# Patient Record
Sex: FEMALE | Race: OTHER | NOT HISPANIC OR LATINO
[De-identification: names, ages, dates, MRNs, and addresses within clinical notes are randomized per-mention and may not be internally consistent; named-entity substitution may affect disease eponyms.]

---

## 2020-06-03 ENCOUNTER — LABORATORY RESULT (OUTPATIENT)
Age: 55
End: 2020-06-03

## 2020-06-03 ENCOUNTER — OUTPATIENT (OUTPATIENT)
Dept: OUTPATIENT SERVICES | Facility: HOSPITAL | Age: 55
LOS: 1 days | End: 2020-06-03
Payer: COMMERCIAL

## 2020-06-03 ENCOUNTER — APPOINTMENT (OUTPATIENT)
Dept: RHEUMATOLOGY | Facility: CLINIC | Age: 55
End: 2020-06-03
Payer: COMMERCIAL

## 2020-06-03 ENCOUNTER — APPOINTMENT (OUTPATIENT)
Dept: RADIOLOGY | Facility: CLINIC | Age: 55
End: 2020-06-03
Payer: COMMERCIAL

## 2020-06-03 VITALS
WEIGHT: 158 LBS | OXYGEN SATURATION: 98 % | HEART RATE: 108 BPM | BODY MASS INDEX: 29.08 KG/M2 | DIASTOLIC BLOOD PRESSURE: 96 MMHG | SYSTOLIC BLOOD PRESSURE: 150 MMHG | HEIGHT: 62 IN | TEMPERATURE: 97.8 F

## 2020-06-03 DIAGNOSIS — M79.7 FIBROMYALGIA: ICD-10-CM

## 2020-06-03 DIAGNOSIS — Z72.89 OTHER PROBLEMS RELATED TO LIFESTYLE: ICD-10-CM

## 2020-06-03 DIAGNOSIS — Z78.9 OTHER SPECIFIED HEALTH STATUS: ICD-10-CM

## 2020-06-03 PROCEDURE — 73070 X-RAY EXAM OF ELBOW: CPT | Mod: 26,LT

## 2020-06-03 PROCEDURE — 99204 OFFICE O/P NEW MOD 45 MIN: CPT

## 2020-06-03 PROCEDURE — 72040 X-RAY EXAM NECK SPINE 2-3 VW: CPT | Mod: 26

## 2020-06-03 PROCEDURE — 73070 X-RAY EXAM OF ELBOW: CPT

## 2020-06-03 PROCEDURE — 72040 X-RAY EXAM NECK SPINE 2-3 VW: CPT

## 2020-06-03 RX ORDER — CETIRIZINE HCL 10 MG
TABLET ORAL
Refills: 0 | Status: ACTIVE | COMMUNITY

## 2020-06-03 RX ORDER — DICLOFENAC SODIUM 10 MG/G
1 GEL TOPICAL
Qty: 5 | Refills: 6 | Status: ACTIVE | COMMUNITY
Start: 2020-06-03 | End: 1900-01-01

## 2020-06-03 RX ORDER — ALPRAZOLAM 0.25 MG/1
0.25 TABLET ORAL
Refills: 0 | Status: ACTIVE | COMMUNITY

## 2020-06-03 NOTE — HISTORY OF PRESENT ILLNESS
[FreeTextEntry1] : 54 year old  female with a history of fibromyalgia and chronic fatigue syndrome presents for further evaluation of her fibromyalgia. She recalls seeing rheumatology many years ago. 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 Because of this her routine has been messed up. She is feeling more achy and fatigued than usual. She states the fatigue as severe. She rates her aches and pains as moderate. She was supposed to get sleep studies but it got canceled in early March.\par \par The main issue is the fact that she cannot sleep. She uses amitriptyline to help with sleep and helps on occasion but not always.\par \par She admits to hair thinning, occasional rashes, Raynaud's phenomenon and joint pains. She denies daily NSAID use. She tries to use supplements. She denies arises or colitis. She has a history of asthma but tries to avoid oral steroid use.\par \par She has had one pregnancy denies miscarriages or thromboembolism. There is no family history of autoimmunity.\par \par She has a good appetite and denies weight loss. She denies fevers or chills or night sweats. She is otherwise up-to-date on her age-appropriate screenings.

## 2020-06-03 NOTE — REVIEW OF SYSTEMS
[Arthralgias] : arthralgias [Joint Pain] : joint pain [Feeling Tired] : feeling tired [Negative] : Heme/Lymph

## 2020-06-03 NOTE — PHYSICAL EXAM
[General Appearance - Well Nourished] : well nourished [General Appearance - Alert] : alert [General Appearance - Well Developed] : well developed [Sclera] : the sclera and conjunctiva were normal [Neck Appearance] : the appearance of the neck was normal [Oropharynx] : the oropharynx was normal [Respiration, Rhythm And Depth] : normal respiratory rhythm and effort [Auscultation Breath Sounds / Voice Sounds] : lungs were clear to auscultation bilaterally [Heart Sounds] : normal S1 and S2 [Full Pulse] : the pedal pulses are present [Edema] : there was no peripheral edema [Abdomen Tenderness] : non-tender [Cervical Lymph Nodes Enlarged Anterior Bilaterally] : anterior cervical [Supraclavicular Lymph Nodes Enlarged Bilaterally] : supraclavicular [No Spinal Tenderness] : no spinal tenderness [Abnormal Walk] : normal gait [Nail Clubbing] : no clubbing  or cyanosis of the fingernails [Motor Tone] : muscle strength and tone were normal [Musculoskeletal - Swelling] : no joint swelling seen [] : no rash [Deep Tendon Reflexes (DTR)] : deep tendon reflexes were 2+ and symmetric [Motor Exam] : the motor exam was normal [Affect] : the affect was normal [Impaired Insight] : insight and judgment were intact [Oriented To Time, Place, And Person] : oriented to person, place, and time [FreeTextEntry1] : FROM neck, shoulders, elbows, wrists, hands, hips, knees, ankles and feet, including the small joints of the hands and feet without any evidence of inflammatory arthritis no tender points noted restricted ROM left elbow

## 2020-06-03 NOTE — ASSESSMENT
[FreeTextEntry1] : 54-year-old  female with a history of fibromyalgia and chronic fatigue syndrome presents as a new patient today. She recalls seeing rheumatology many years ago.\par \par Her current review of systems is positive for fatigue, chronic pain, poor sleep, recent worsening of joint pain that has been migratory in nature.\par Today there are no labs available for review. On my examination she has full range of motion of her joints without any evidence of inflammatory arthritis. She does not have any tender points on exam. She does have restricted range of motion of her left elbow, she denies any trauma or injury. There is no warmth emanating from the elbow.\par \par At this time certainly fibromyalgia is the possible diagnosis. The other possibility includes sleep apnea given her poor sleep habits. I am concerned about the restricted range of motion of her left elbow and will evaluate her for inflammatory arthropathy as well.\par \par Plan-\par -She is to continue amitriptyline at bedtime\par -Recommend sleep studies as she was scheduled for them, she may have sleep apnea.\par -Recommend routine with sleep habits and avoiding daytime naps, to exercise as tolerated.\par -She is to have baseline labs to rule out possible inflammatory arthropathy.\par -She is to have baseline x-ray of her neck and elbow to evaluate for cervical radiculopathy as well as inflammatory arthropathy.\par -I will be calling her with the results\par -Trial of voltaren gel\par -Followup in the office in 4 weeks\par -She is aware to call if her symptoms worsen

## 2020-06-03 NOTE — CONSULT LETTER
[Dear  ___] : Dear  [unfilled], [Consult Letter:] : I had the pleasure of evaluating your patient, [unfilled]. [Please see my note below.] : Please see my note below. [Consult Closing:] : Thank you very much for allowing me to participate in the care of this patient.  If you have any questions, please do not hesitate to contact me. [Sincerely,] : Sincerely, [FreeTextEntry3] : Joel Blevins D.O\par

## 2020-06-04 LAB
25(OH)D3 SERPL-MCNC: 24.9 NG/ML
ALBUMIN MFR SERPL ELPH: 54.3 %
ALBUMIN SERPL ELPH-MCNC: 4.5 G/DL
ALBUMIN SERPL-MCNC: 4.1 G/DL
ALBUMIN/GLOB SERPL: 1.2 RATIO
ALP BLD-CCNC: 69 U/L
ALPHA1 GLOB MFR SERPL ELPH: 4.1 %
ALPHA1 GLOB SERPL ELPH-MCNC: 0.3 G/DL
ALPHA2 GLOB MFR SERPL ELPH: 12.1 %
ALPHA2 GLOB SERPL ELPH-MCNC: 0.9 G/DL
ALT SERPL-CCNC: 14 U/L
ANION GAP SERPL CALC-SCNC: 14 MMOL/L
AST SERPL-CCNC: 19 U/L
B-GLOBULIN MFR SERPL ELPH: 14.4 %
B-GLOBULIN SERPL ELPH-MCNC: 1.1 G/DL
BASOPHILS # BLD AUTO: 0.05 K/UL
BASOPHILS NFR BLD AUTO: 0.8 %
BILIRUB SERPL-MCNC: 0.3 MG/DL
BUN SERPL-MCNC: 15 MG/DL
CALCIUM SERPL-MCNC: 9.9 MG/DL
CENTROMERE IGG SER-ACNC: <0.2 CD:130001892
CHLORIDE SERPL-SCNC: 103 MMOL/L
CK SERPL-CCNC: 73 U/L
CO2 SERPL-SCNC: 24 MMOL/L
CREAT SERPL-MCNC: 0.89 MG/DL
CREAT SPEC-SCNC: 225 MG/DL
CREAT/PROT UR: 0.1 RATIO
CRP SERPL-MCNC: 0.68 MG/DL
DSDNA AB SER-ACNC: <12 IU/ML
ENA RNP AB SER IA-ACNC: 1.6 AL
ENA SM AB SER IA-ACNC: <0.2 AL
ENA SS-A AB SER IA-ACNC: <0.2 AL
ENA SS-B AB SER IA-ACNC: <0.2 AL
EOSINOPHIL # BLD AUTO: 0.6 K/UL
EOSINOPHIL NFR BLD AUTO: 9.4 %
ERYTHROCYTE [SEDIMENTATION RATE] IN BLOOD BY WESTERGREN METHOD: 59 MM/HR
GAMMA GLOB FLD ELPH-MCNC: 1.1 G/DL
GAMMA GLOB MFR SERPL ELPH: 15.1 %
GLUCOSE SERPL-MCNC: 69 MG/DL
HAV IGM SER QL: NONREACTIVE
HBV CORE IGM SER QL: NONREACTIVE
HBV SURFACE AG SER QL: NONREACTIVE
HCT VFR BLD CALC: 42.9 %
HCV AB SER QL: NONREACTIVE
HCV S/CO RATIO: 0.51 S/CO
HGB BLD-MCNC: 13.6 G/DL
IMM GRANULOCYTES NFR BLD AUTO: 0.3 %
INTERPRETATION SERPL IEP-IMP: NORMAL
LUPUS ANTICOAGULANT CASCADE REFLEX: NORMAL
LYMPHOCYTES # BLD AUTO: 1.86 K/UL
LYMPHOCYTES NFR BLD AUTO: 29.1 %
MAN DIFF?: NORMAL
MCHC RBC-ENTMCNC: 29.1 PG
MCHC RBC-ENTMCNC: 31.7 GM/DL
MCV RBC AUTO: 91.7 FL
MONOCYTES # BLD AUTO: 0.37 K/UL
MONOCYTES NFR BLD AUTO: 5.8 %
MPO AB + PR3 PNL SER: NORMAL
NEUTROPHILS # BLD AUTO: 3.5 K/UL
NEUTROPHILS NFR BLD AUTO: 54.6 %
PLATELET # BLD AUTO: 291 K/UL
POTASSIUM SERPL-SCNC: 4.2 MMOL/L
PROT SERPL-MCNC: 7.5 G/DL
PROT UR-MCNC: 14 MG/DL
RBC # BLD: 4.68 M/UL
RBC # FLD: 13.7 %
RHEUMATOID FACT SER QL: <10 IU/ML
SODIUM SERPL-SCNC: 141 MMOL/L
THYROGLOB AB SERPL-ACNC: <20 IU/ML
THYROPEROXIDASE AB SERPL IA-ACNC: 53.5 IU/ML
WBC # FLD AUTO: 6.4 K/UL

## 2020-06-05 LAB
CARDIOLIPIN IGM SER-MCNC: 8.4 MPL
CARDIOLIPIN IGM SER-MCNC: <5 GPL
CCP AB SER IA-ACNC: <8 UNITS
HLA-B27 RELATED AG QL: NORMAL
RF+CCP IGG SER-IMP: NEGATIVE

## 2020-06-08 LAB
ANA SER IF-ACNC: NEGATIVE
G6PD SER-CCNC: 12.1 U/G HGB

## 2020-06-23 ENCOUNTER — APPOINTMENT (OUTPATIENT)
Dept: RHEUMATOLOGY | Facility: CLINIC | Age: 55
End: 2020-06-23
Payer: COMMERCIAL

## 2020-06-23 VITALS
HEART RATE: 101 BPM | OXYGEN SATURATION: 98 % | BODY MASS INDEX: 28.9 KG/M2 | SYSTOLIC BLOOD PRESSURE: 160 MMHG | WEIGHT: 158 LBS | DIASTOLIC BLOOD PRESSURE: 100 MMHG | TEMPERATURE: 97.9 F

## 2020-06-23 PROCEDURE — 99214 OFFICE O/P EST MOD 30 MIN: CPT

## 2020-06-23 NOTE — ASSESSMENT
[FreeTextEntry1] : 54-year-old  female with a history of fibromyalgia and chronic fatigue syndrome recalls seeing rheumatology many years ago.\par \par Her current review of systems is positive for fatigue, chronic pain, poor sleep, recent worsening of joint pain that has been migratory in nature.\par On my examination she has full range of motion of her joints without any evidence of inflammatory arthritis. She does not have any tender points on exam. She does have restricted range of motion of her left elbow, she denies any trauma or injury. There is no warmth emanating from the elbow.\par \par At this time certainly fibromyalgia is the possible diagnosis. 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 or b.i.d. depending on her pain level\par -I will reassess her symptoms with the higher dose of amitriptyline\par -If she continues to be symptomatic will consider using Plaquenil\par -She will also need a baseline echocardiogram and a chest x-ray\par -Left message to speak with her primary care physician regarding this\par -her sedimentation rate is in the 60s but workup for monoclonal paraproteinemia is negative\par -Followup 6 weeks\par

## 2020-06-23 NOTE — PHYSICAL EXAM
[General Appearance - Alert] : alert [General Appearance - Well Developed] : well developed [General Appearance - Well Nourished] : well nourished [Sclera] : the sclera and conjunctiva were normal [Oropharynx] : the oropharynx was normal [Neck Appearance] : the appearance of the neck was normal [Respiration, Rhythm And Depth] : normal respiratory rhythm and effort [Auscultation Breath Sounds / Voice Sounds] : lungs were clear to auscultation bilaterally [Heart Sounds] : normal S1 and S2 [Full Pulse] : the pedal pulses are present [Edema] : there was no peripheral edema [Abdomen Tenderness] : non-tender [Supraclavicular Lymph Nodes Enlarged Bilaterally] : supraclavicular [Cervical Lymph Nodes Enlarged Anterior Bilaterally] : anterior cervical [No Spinal Tenderness] : no spinal tenderness [Abnormal Walk] : normal gait [Musculoskeletal - Swelling] : no joint swelling seen [Nail Clubbing] : no clubbing  or cyanosis of the fingernails [Motor Tone] : muscle strength and tone were normal [FreeTextEntry1] : FROM neck, shoulders, elbows, wrists, hands, hips, knees, ankles and feet, including the small joints of the hands and feet without any evidence of inflammatory arthritis no tender points noted restricted ROM left elbow [] : no rash [Oriented To Time, Place, And Person] : oriented to person, place, and time [Motor Exam] : the motor exam was normal [Deep Tendon Reflexes (DTR)] : deep tendon reflexes were 2+ and symmetric [Impaired Insight] : insight and judgment were intact [Affect] : the affect was normal

## 2020-06-23 NOTE — HISTORY OF PRESENT ILLNESS
[FreeTextEntry1] : 54 year old  female with a history of fibromyalgia and chronic fatigue syndrome presents for further evaluation of her fibromyalgia. She recalls seeing rheumatology many years ago. 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 Because of this her routine has been messed up. She is feeling more achy and fatigued than usual. She states the fatigue as severe. She rates her aches and pains as moderate. She was supposed to get sleep studies but it got canceled in early March.\par \par The main issue is the fact that she cannot sleep. She uses amitriptyline to help with sleep and helps on occasion but not always.\par \par She admits to hair thinning, occasional rashes, Raynaud's phenomenon and joint pains. She denies daily NSAID use. She tries to use supplements. She denies arises or colitis. She has a history of asthma but tries to avoid oral steroid use.\par \par She has had one pregnancy denies miscarriages or thromboembolism. There is no family history of autoimmunity.\par Her labs with me reveal a sed rate in the 60s with a RNP an and thyroid ab but her SHAWN is negative \par She has a good appetite and denies weight loss. She denies fevers or chills or night sweats. She is otherwise up-to-date on her age-appropriate screenings.

## 2020-07-09 ENCOUNTER — APPOINTMENT (OUTPATIENT)
Dept: BARIATRICS/WEIGHT MGMT | Facility: CLINIC | Age: 55
End: 2020-07-09
Payer: COMMERCIAL

## 2020-07-09 DIAGNOSIS — Z72.820 SLEEP DEPRIVATION: ICD-10-CM

## 2020-07-09 PROCEDURE — 99205 OFFICE O/P NEW HI 60 MIN: CPT | Mod: 95

## 2020-07-14 ENCOUNTER — APPOINTMENT (OUTPATIENT)
Dept: BARIATRICS/WEIGHT MGMT | Facility: CLINIC | Age: 55
End: 2020-07-14
Payer: COMMERCIAL

## 2020-07-14 VITALS
SYSTOLIC BLOOD PRESSURE: 132 MMHG | HEART RATE: 86 BPM | WEIGHT: 163.25 LBS | HEIGHT: 62 IN | BODY MASS INDEX: 30.04 KG/M2 | DIASTOLIC BLOOD PRESSURE: 80 MMHG

## 2020-07-14 DIAGNOSIS — M25.50 PAIN IN UNSPECIFIED JOINT: ICD-10-CM

## 2020-07-14 PROCEDURE — 99213 OFFICE O/P EST LOW 20 MIN: CPT

## 2020-07-14 NOTE — HISTORY OF PRESENT ILLNESS
[FreeTextEntry1] : This is a 54 year old female  present in office today for followup visit. \par \par Patient reports of joint pain, specifically in her knees the last couple of days. \par Her rheumatologist wants to start her on hydroxychloroquine for possible connective tissue disorder, and her PCP wants to start her on synthroid. \par Recent blood work done with rheum\par She is having a sleep study at the end of the month

## 2020-07-14 NOTE — PHYSICAL EXAM
[Obese, well nourished, in no acute distress] : obese, well nourished, in no acute distress [Normal] : affect appropriate [de-identified] : mallampati 3 [de-identified] : softly distended

## 2020-07-14 NOTE — ASSESSMENT
[FreeTextEntry1] : Physical assessment completed \par Blood work done in office \par Sleep study scheduled for next month \par \par f/u with Dr Joyce

## 2020-07-16 LAB
CHOLEST SERPL-MCNC: 185 MG/DL
CHOLEST/HDLC SERPL: 2.4 RATIO
ESTIMATED AVERAGE GLUCOSE: 108 MG/DL
HBA1C MFR BLD HPLC: 5.4 %
HDLC SERPL-MCNC: 76 MG/DL
INSULIN P FAST SERPL-ACNC: 3.4 UU/ML
LDLC SERPL CALC-MCNC: 100 MG/DL
TRIGL SERPL-MCNC: 47 MG/DL

## 2020-07-21 ENCOUNTER — APPOINTMENT (OUTPATIENT)
Dept: BARIATRICS/WEIGHT MGMT | Facility: CLINIC | Age: 55
End: 2020-07-21
Payer: COMMERCIAL

## 2020-07-21 DIAGNOSIS — M79.7 FIBROMYALGIA: ICD-10-CM

## 2020-07-21 DIAGNOSIS — E66.3 OVERWEIGHT: ICD-10-CM

## 2020-07-21 PROCEDURE — 99214 OFFICE O/P EST MOD 30 MIN: CPT | Mod: 95

## 2020-08-07 ENCOUNTER — APPOINTMENT (OUTPATIENT)
Dept: RHEUMATOLOGY | Facility: CLINIC | Age: 55
End: 2020-08-07

## 2020-08-12 PROBLEM — M79.7 FIBROMYALGIA: Status: ACTIVE | Noted: 2020-06-03

## 2020-08-12 PROBLEM — Z72.820 POOR SLEEP: Status: ACTIVE | Noted: 2020-06-03

## 2020-08-12 PROBLEM — E66.3 OVERWEIGHT (BMI 25.0-29.9): Status: ACTIVE | Noted: 2020-07-14

## 2020-08-12 NOTE — HISTORY OF PRESENT ILLNESS
[Home] : at home, [unfilled] , at the time of the visit. [Medical Office: ([facility])___] : at the medical office located in  [Verbal consent obtained from patient] : the patient, [unfilled] [FreeTextEntry1] : 55 yo woman with obesity, htn, anemia,  hashimoto's thyroiditis, fibromylagia and MCTD presents for initial eval and mgt of obesity and its sequelae.  She reports that weight and fatigue have been an issue for 29 years.  Was on bed rest and issues after pregnancy.  She has not had substantial weight loss as an adult (10lbs from weight watchers once).  She is currently at her max of 165 (BMI 30) with a steady 60+ lb weight gain since high school.  \par She has been seeing rheumatology for her fatigue and generalized pain.  Was originally diagnosed with fibromnyaglia and ultimately mixed connective tissues disease.  Also was told she had autoimmune thyroiditis and was recently prescribed thyroid replacement therapy but has not started yet.  Recent TSH was 5.6 with low normal free T3/freeT4.  \par She has issues with sleep quality and wakes up fatigued.  She does not engage in regular exercise. \par She typically skips breakfast and has lunch and dinner.  Occasionally has pizza or fried food but otherwise reports a lower total calorie, balanced diet with minimal meat.  She does consume some etoh. \par \par She would like to lose weight, improve energy and pain and help her  who is also enrolled in the program.

## 2020-08-12 NOTE — ASSESSMENT
[FreeTextEntry1] : BARIATRIC SURGERY HISTORY: none\par \par OBESITY COMORBIDITIES: HTN\par \par ANTI-OBESITY MEDICATIONS: none\par \par OBESITY MEDICATION SIDE EFFECTS: n/a\par \par 55 yo woman with obesity, htn, anemia,  hashimoto's thyroiditis, fibromylagia and MCTD\par \par will start 0.5 grains of armour thyroid\par start iron supplementation\par start b12\par recommended nguyễn/turneric/matcha tea\par goal is ultimately to increase physical activity but should do so slowly\par WFPB (or close to it) diet\par \par rtc in 3 weeks.\par close monitoring\par \par rtc in 1-2 weeks\par

## 2020-08-12 NOTE — ASSESSMENT
[FreeTextEntry1] : BARIATRIC SURGERY HISTORY: none\par \par OBESITY COMORBIDITIES: HTN\par \par ANTI-OBESITY MEDICATIONS: none\par \par OBESITY MEDICATION SIDE EFFECTS: n/a\par \par 55 yo woman with obesity, htn, anemia,  hashimoto's thyroiditis, fibromylagia and MCTD\par \par will come for in-person physical exam\par recheck some metabolic labs\par agree with starting thyroid meds, patient would prefer to be on armour thyroid than synthetic agent\par likely to recommend WFPB eating \par adequate hydration\par sleep hygeine\par close monitoring\par \par rtc in 1-2 weeks\par

## 2020-08-12 NOTE — HISTORY OF PRESENT ILLNESS
[Home] : at home, [unfilled] , at the time of the visit. [Medical Office: ([facility])___] : at the medical office located in  [Verbal consent obtained from patient] : the patient, [unfilled] [FreeTextEntry1] : returns for f/u\par physical exam complete and generally unremarkable\par new labs do not reveal metabolic dysfunction\par she reports feeling generally the same and wants to discuss meds

## 2020-08-12 NOTE — REASON FOR VISIT
[Follow-Up Visit] : a follow-up visit for [Obesity] : obesity [FreeTextEntry2] : polyarthralgia, fatigue, hypothyroidism

## 2020-08-21 ENCOUNTER — APPOINTMENT (OUTPATIENT)
Dept: RHEUMATOLOGY | Facility: CLINIC | Age: 55
End: 2020-08-21
Payer: COMMERCIAL

## 2020-08-21 VITALS
WEIGHT: 164 LBS | DIASTOLIC BLOOD PRESSURE: 80 MMHG | HEART RATE: 84 BPM | BODY MASS INDEX: 30.18 KG/M2 | SYSTOLIC BLOOD PRESSURE: 110 MMHG | TEMPERATURE: 96.8 F | HEIGHT: 62 IN | OXYGEN SATURATION: 96 %

## 2020-08-21 PROCEDURE — 99214 OFFICE O/P EST MOD 30 MIN: CPT

## 2020-08-21 RX ORDER — AMITRIPTYLINE HYDROCHLORIDE 50 MG/1
50 TABLET, FILM COATED ORAL
Qty: 90 | Refills: 1 | Status: ACTIVE | COMMUNITY
Start: 1900-01-01 | End: 1900-01-01

## 2020-08-21 RX ORDER — MELOXICAM 7.5 MG/1
7.5 TABLET ORAL TWICE DAILY
Qty: 60 | Refills: 2 | Status: DISCONTINUED | COMMUNITY
Start: 2020-06-23 | End: 2020-08-21

## 2020-08-21 NOTE — ASSESSMENT
[FreeTextEntry1] : 55-year-old  female with a history of fibromyalgia and chronic fatigue syndrome recalls seeing rheumatology many years ago.\par \par Her current review of systems is positive for fatigue, chronic pain, poor sleep, recent worsening of joint pain that has been migratory in nature.\par On my examination she has full range of motion of her joints without any evidence of inflammatory arthritis. She does not have any tender points on exam. She does have restricted range of motion of her left elbow, she denies any trauma or injury. There is no warmth emanating from the elbow.\par \par At this time certainly fibromyalgia is the possible diagnosis. The other possibility includes sleep apnea given her poor sleep habits.\par \par Fatigue-\par -Her main complaint is fatigue\par -She was supposed to get ruled out for sleep apnea and should continue to do this\par -maintain elavil at 50 mg qd \par - she is now on armor thyroid. \par - will obtain copy of sleep studies \par \par Abnormal labs-\par -Her labs revealed positive RNP with thyroid antibodies and a sedimentation rate in the 60s\par -She states that years ago she was told she may have lupus\par -Her SHAWN is negative\par -Certainly mixed connective tissue disease is in the differential diagnosis although I am not certain yet\par -Discussed with her that we could potentially try Plaquenil\par -If she continues to be symptomatic will consider using Plaquenil\par -She will also need a baseline echocardiogram and a chest x-ray\par -plan d/w her primary care physician regarding this\par -her sedimentation rate is in the 60s but workup for monoclonal paraproteinemia is negative\par -Followup 6 weeks\par

## 2020-08-21 NOTE — PHYSICAL EXAM
[General Appearance - Alert] : alert [General Appearance - Well Developed] : well developed [General Appearance - Well Nourished] : well nourished [Sclera] : the sclera and conjunctiva were normal [Respiration, Rhythm And Depth] : normal respiratory rhythm and effort [Oropharynx] : the oropharynx was normal [Neck Appearance] : the appearance of the neck was normal [Auscultation Breath Sounds / Voice Sounds] : lungs were clear to auscultation bilaterally [Heart Sounds] : normal S1 and S2 [Full Pulse] : the pedal pulses are present [Edema] : there was no peripheral edema [Cervical Lymph Nodes Enlarged Anterior Bilaterally] : anterior cervical [Abdomen Tenderness] : non-tender [Supraclavicular Lymph Nodes Enlarged Bilaterally] : supraclavicular [No Spinal Tenderness] : no spinal tenderness [Abnormal Walk] : normal gait [Nail Clubbing] : no clubbing  or cyanosis of the fingernails [Musculoskeletal - Swelling] : no joint swelling seen [Motor Tone] : muscle strength and tone were normal [] : no rash [Motor Exam] : the motor exam was normal [Deep Tendon Reflexes (DTR)] : deep tendon reflexes were 2+ and symmetric [Oriented To Time, Place, And Person] : oriented to person, place, and time [Impaired Insight] : insight and judgment were intact [Affect] : the affect was normal [FreeTextEntry1] : FROM neck, shoulders, elbows, wrists, hands, hips, knees, ankles and feet, including the small joints of the hands and feet without any evidence of inflammatory arthritis no tender points noted restricted ROM left elbow

## 2020-08-21 NOTE — HISTORY OF PRESENT ILLNESS
[FreeTextEntry1] : 55 year old  female with a history of fibromyalgia and chronic fatigue syndrome presents for further evaluation of her fibromyalgia. She recalls seeing rheumatology many years ago. That's when she was diagnosed with fibromyalgia.\par \par since her last visit with me she is now using amitrytiline 50 mag, and she has been started on armor thyroid about a month. she is better. she feels less achy, 7/10 and fatigue 10/10. she states the fatigue as severe. She rates her aches and pains as moderate. She had sleep studies but results are pending. \par \par The higher dose of elvail has helped her sleep. \par \par She admits to hair thinning, occasional rashes, Raynaud's phenomenon and joint pains. She denies daily NSAID use. She tries to use supplements. She denies arises or colitis. She has a history of asthma but tries to avoid oral steroid use.\par \par She has had one pregnancy denies miscarriages or thromboembolism. There is no family history of autoimmunity.\par Her labs with me reveal a sed rate in the 60s with a RNP an and thyroid ab but her SHAWN is negative \par She has a good appetite and denies weight loss. She denies fevers or chills or night sweats. She is otherwise up-to-date on her age-appropriate screenings.

## 2020-10-02 ENCOUNTER — APPOINTMENT (OUTPATIENT)
Dept: RHEUMATOLOGY | Facility: CLINIC | Age: 55
End: 2020-10-02

## 2020-10-15 LAB
CCP AB SER IA-ACNC: <8 UNITS
CK SERPL-CCNC: 70 U/L
CRP SERPL-MCNC: 1.13 MG/DL
DSDNA AB SER-ACNC: <12 IU/ML
ENA RNP AB SER IA-ACNC: 1.6 AL
ENA SM AB SER IA-ACNC: <0.2 AL
ENA SS-A AB SER IA-ACNC: <0.2 AL
ENA SS-B AB SER IA-ACNC: <0.2 AL
ERYTHROCYTE [SEDIMENTATION RATE] IN BLOOD BY WESTERGREN METHOD: 48 MM/HR
RF+CCP IGG SER-IMP: NEGATIVE
RHEUMATOID FACT SER QL: <10 IU/ML
TSH SERPL-ACNC: 2.27 UIU/ML

## 2020-10-16 ENCOUNTER — APPOINTMENT (OUTPATIENT)
Dept: RHEUMATOLOGY | Facility: CLINIC | Age: 55
End: 2020-10-16
Payer: COMMERCIAL

## 2020-10-16 VITALS
SYSTOLIC BLOOD PRESSURE: 168 MMHG | DIASTOLIC BLOOD PRESSURE: 110 MMHG | HEART RATE: 95 BPM | BODY MASS INDEX: 30.91 KG/M2 | TEMPERATURE: 96.4 F | HEIGHT: 62 IN | OXYGEN SATURATION: 97 % | WEIGHT: 168 LBS

## 2020-10-16 DIAGNOSIS — R76.8 OTHER SPECIFIED ABNORMAL IMMUNOLOGICAL FINDINGS IN SERUM: ICD-10-CM

## 2020-10-16 DIAGNOSIS — M25.50 PAIN IN UNSPECIFIED JOINT: ICD-10-CM

## 2020-10-16 DIAGNOSIS — R70.0 ELEVATED ERYTHROCYTE SEDIMENTATION RATE: ICD-10-CM

## 2020-10-16 LAB — ANA SER IF-ACNC: NEGATIVE

## 2020-10-16 PROCEDURE — 99214 OFFICE O/P EST MOD 30 MIN: CPT

## 2020-10-16 RX ORDER — HYDROXYCHLOROQUINE SULFATE 200 MG/1
200 TABLET, FILM COATED ORAL TWICE DAILY
Qty: 60 | Refills: 3 | Status: ACTIVE | COMMUNITY
Start: 2020-10-16 | End: 1900-01-01

## 2020-10-17 PROBLEM — R70.0 ELEVATED SED RATE: Status: ACTIVE | Noted: 2020-06-23

## 2020-10-17 PROBLEM — M25.50 POLYARTHRALGIA: Status: ACTIVE | Noted: 2020-06-03

## 2020-10-17 NOTE — ASSESSMENT
[FreeTextEntry1] : 55-year-old  female with a history of fibromyalgia and chronic fatigue syndrome recalls seeing rheumatology many years ago.\par \par Her current review of systems is positive for fatigue, chronic pain, poor sleep, recent worsening of joint pain that has been migratory in nature.\par On my examination she has full range of motion of her joints without any evidence of inflammatory arthritis. She does not have any tender points on exam. She does have restricted range of motion of her left elbow, she denies any trauma or injury. \par At this time certainly fibromyalgia is the possible diagnosis. The other possibility includes sleep apnea given her poor sleep habits.\par \par Fatigue-\par -Her main complaint is fatigue\par -She was supposed to get ruled out for sleep apnea and should continue to do this\par -maintain elavil at 50 mg qd \par - she is now on armor thyroid. \par \par UCTD-\par -Her labs revealed positive RNP with thyroid antibodies and a sedimentation rate in the 60s\par -She states that years ago she was told she may have lupus\par -Her SHAWN is negative\par -Certainly mixed connective tissue disease/ UCTD is in the differential diagnosis \par -will start PLQ, to have eye exam\par -CXR and echo are normal \par -her sedimentation rate is in the 60s but workup for monoclonal paraproteinemia is negative\par -Followup 6 weeks\par

## 2020-10-17 NOTE — HISTORY OF PRESENT ILLNESS
[FreeTextEntry1] : 55 year old  female with a history of fibromyalgia and chronic fatigue syndrome presents for further evaluation of her fibromyalgia. \par \par since her last visit with me she is now using amitrytiline 50 mag, and she has been started on armor thyroid about 3 month. she is better. she feels less achy, 5/10 and fatigue 7/10. she states the fatigue as severe. She rates her aches and pains as moderate. The higher dose of elvail has helped her sleep. \par \par She admits to hair thinning, occasional rashes, Raynaud's phenomenon and joint pains. She denies daily NSAID use. She tries to use supplements. She denies psoriasis or colitis. She has a history of asthma but tries to avoid oral steroid use.\par \par She has had one pregnancy denies miscarriages or thromboembolism. There is no family history of autoimmunity.\par Her labs with me reveal a sed rate in the 60s with a RNP an and thyroid ab but her SHAWN is negative \par She has a good appetite and denies weight loss. She denies fevers or chills or night sweats.

## 2020-10-17 NOTE — PHYSICAL EXAM
[General Appearance - Alert] : alert [General Appearance - Well Nourished] : well nourished [General Appearance - Well Developed] : well developed [Oropharynx] : the oropharynx was normal [Sclera] : the sclera and conjunctiva were normal [Respiration, Rhythm And Depth] : normal respiratory rhythm and effort [Auscultation Breath Sounds / Voice Sounds] : lungs were clear to auscultation bilaterally [Neck Appearance] : the appearance of the neck was normal [Edema] : there was no peripheral edema [Full Pulse] : the pedal pulses are present [Heart Sounds] : normal S1 and S2 [Cervical Lymph Nodes Enlarged Anterior Bilaterally] : anterior cervical [Abdomen Tenderness] : non-tender [Supraclavicular Lymph Nodes Enlarged Bilaterally] : supraclavicular [No Spinal Tenderness] : no spinal tenderness [Abnormal Walk] : normal gait [Nail Clubbing] : no clubbing  or cyanosis of the fingernails [Motor Tone] : muscle strength and tone were normal [Musculoskeletal - Swelling] : no joint swelling seen [FreeTextEntry1] : FROM neck, shoulders, elbows, wrists, hands, hips, knees, ankles and feet, including the small joints of the hands and feet without any evidence of inflammatory arthritis no tender points noted restricted ROM left elbow [Motor Exam] : the motor exam was normal [Deep Tendon Reflexes (DTR)] : deep tendon reflexes were 2+ and symmetric [] : no rash [Affect] : the affect was normal [Oriented To Time, Place, And Person] : oriented to person, place, and time [Impaired Insight] : insight and judgment were intact

## 2020-10-19 LAB — G6PD SER-CCNC: 11 U/G HGB

## 2021-01-29 ENCOUNTER — APPOINTMENT (OUTPATIENT)
Dept: RHEUMATOLOGY | Facility: CLINIC | Age: 56
End: 2021-01-29